# Patient Record
Sex: FEMALE | NOT HISPANIC OR LATINO | URBAN - METROPOLITAN AREA
[De-identification: names, ages, dates, MRNs, and addresses within clinical notes are randomized per-mention and may not be internally consistent; named-entity substitution may affect disease eponyms.]

---

## 2020-07-25 ENCOUNTER — TELEPHONE ENCOUNTER (OUTPATIENT)
Dept: URBAN - METROPOLITAN AREA CLINIC 13 | Facility: CLINIC | Age: 75
End: 2020-07-25

## 2020-07-25 RX ORDER — LIRAGLUTIDE 6 MG/ML
INJECT 1.2 MG SUBCUTANEOUSLY EVERY DAY INJECTION SUBCUTANEOUS
Refills: 0 | OUTPATIENT
End: 2017-07-12

## 2020-07-25 RX ORDER — NEBIVOLOL HYDROCHLORIDE 2.5 MG/1
TAKE 1 TABLET DAILY TABLET ORAL
Refills: 0 | OUTPATIENT
End: 2019-12-13

## 2020-07-25 RX ORDER — ESCITALOPRAM 10 MG/1
TABLET, FILM COATED ORAL
Qty: 255 | Refills: 0 | OUTPATIENT
Start: 2012-05-21 | End: 2017-04-12

## 2020-07-25 RX ORDER — DOCUSATE SODIUM 100 MG/1
TAKE 1 TABLET DAILY AS DIRECTED TABLET ORAL
Refills: 0 | OUTPATIENT
End: 2017-07-12

## 2020-07-25 RX ORDER — CELECOXIB 200 MG/1
TAKE 1 CAPSULE DAILY CAPSULE ORAL
Qty: 90 | Refills: 0 | OUTPATIENT
End: 2019-12-13

## 2020-07-25 RX ORDER — EZETIMIBE 10 MG/1
TAKE 1 TABLET DAILY TABLET ORAL
Refills: 0 | OUTPATIENT
End: 2019-12-13

## 2020-07-25 RX ORDER — IPRATROPIUM BROMIDE 42 UG/1
USE 2 SPRAYS IN EACH NOSTRIL 2 TO 3 TIMES DAILY SPRAY, METERED NASAL
Refills: 0 | OUTPATIENT
End: 2019-12-13

## 2020-07-25 RX ORDER — PHENAZOPYRIDINE HYDROCHLORIDE 100 MG/1
TAKE 1 CAPSULE TWICE DAILY TABLET, COATED ORAL
Qty: 60 | Refills: 0 | OUTPATIENT
End: 2017-07-12

## 2020-07-25 RX ORDER — IPRATROPIUM BROMIDE 42 UG/1
SPRAY, METERED NASAL
Qty: 105 | Refills: 0 | OUTPATIENT
Start: 2011-11-30 | End: 2017-07-12

## 2020-07-25 RX ORDER — VORTIOXETINE 20 MG/1
TAKE 1 TABLET DAILY TABLET, FILM COATED ORAL
Refills: 0 | OUTPATIENT
End: 2019-06-10

## 2020-07-25 RX ORDER — ESCITALOPRAM 20 MG/1
TAKE 1 TABLET DAILY TABLET, FILM COATED ORAL
Qty: 90 | Refills: 0 | OUTPATIENT
Start: 2012-07-31 | End: 2017-04-12

## 2020-07-25 RX ORDER — LUBIPROSTONE 24 UG/1
TAKE 1 CAPSULE 2 TIMES     DAILY WITH FOOD CAPSULE, GELATIN COATED ORAL
Qty: 180 | Refills: 3 | OUTPATIENT
Start: 2017-07-15 | End: 2019-12-13

## 2020-07-25 RX ORDER — UBIDECARENONE 60 MG
TAKE 1 CAPSULE TWICE DAILY CAPSULE ORAL
Refills: 0 | OUTPATIENT
End: 2017-07-12

## 2020-07-25 RX ORDER — LEVOCETIRIZINE DIHYDROCHLORIDE 5 MG/1
TAKE 1 TABLET DAILY TABLET ORAL
Refills: 0 | OUTPATIENT
Start: 2011-11-12 | End: 2017-04-12

## 2020-07-25 RX ORDER — OXCARBAZEPINE 600 MG/1
TAKE 1 TABLET TWICE DAILY TABLET, FILM COATED ORAL
Refills: 0 | OUTPATIENT
End: 2017-04-12

## 2020-07-25 RX ORDER — LINACLOTIDE 290 UG/1
TAKE 1 CAPSULE DAILY ON EMPTY STOMACH CAPSULE, GELATIN COATED ORAL
Qty: 30 | Refills: 1 | OUTPATIENT
End: 2017-07-15

## 2020-07-25 RX ORDER — CYCLOSPORINE 0.5 MG/ML
USE AS DIRECTED EMULSION OPHTHALMIC
Refills: 0 | OUTPATIENT
End: 2018-02-19

## 2020-07-25 RX ORDER — ROSUVASTATIN CALCIUM 40 MG/1
TAKE 1 TABLET DAILY TABLET, FILM COATED ORAL
Refills: 0 | OUTPATIENT
Start: 2011-08-19 | End: 2017-07-12

## 2020-07-25 RX ORDER — METHYLCELLULOSE 2 G/10.2G
USE AS DIRECTED POWDER, FOR SOLUTION ORAL
Refills: 0 | OUTPATIENT
End: 2017-07-12

## 2020-07-25 RX ORDER — ARIPIPRAZOLE 5 MG/1
TAKE 1 TABLET TWICE DAILY TABLET ORAL
Refills: 0 | OUTPATIENT
End: 2018-10-11

## 2020-07-25 RX ORDER — LEVOTHYROXINE SODIUM 75 UG/1
TAKE 1 TABLET DAILY TABLET ORAL
Refills: 0 | OUTPATIENT
End: 2020-04-23

## 2020-07-25 RX ORDER — LINACLOTIDE 290 UG/1
TAKE 1 CAPSULE DAILY ON EMPTY STOMACH CAPSULE, GELATIN COATED ORAL
Qty: 90 | Refills: 1 | OUTPATIENT
Start: 2017-07-17 | End: 2017-07-20

## 2020-07-25 RX ORDER — POLYETHYLENE GLYCOL 3350, SODIUM CHLORIDE, SODIUM BICARBONATE AND POTASSIUM CHLORIDE WITH LEMON FLAVOR 420; 11.2; 5.72; 1.48 G/4L; G/4L; G/4L; G/4L
DRINK 8OUNCES EVERY 20 MINUTES UNTIL GONE STARTING AT 5PM DAY BEFORE PROCEDURE POWDER, FOR SOLUTION ORAL
Qty: 1 | Refills: 0 | OUTPATIENT
Start: 2012-02-01 | End: 2012-10-12

## 2020-07-25 RX ORDER — PROPRANOLOL HYDROCHLORIDE 10 MG/1
TAKE 1 TABLET 3 TIMES DAILY BEFORE MEALS TABLET ORAL
Refills: 0 | OUTPATIENT
Start: 2019-05-28 | End: 2019-12-13

## 2020-07-25 RX ORDER — LUBIPROSTONE 24 UG/1
TAKE ONE CAPSULE BY MOUTH TWICE A DAY CAPSULE, GELATIN COATED ORAL
Qty: 60 | Refills: 10 | OUTPATIENT
End: 2020-04-23

## 2020-07-25 RX ORDER — LANSOPRAZOLE 30 MG/1
TAKE 1 CAPSULE TWICE DAILY CAPSULE, DELAYED RELEASE ORAL
Refills: 0 | OUTPATIENT
Start: 2011-01-03 | End: 2017-04-12

## 2020-07-25 RX ORDER — GABAPENTIN 800 MG/1
TAKE 1 TABLET 3 TIMES DAILY. TDD:300MG TABLET, FILM COATED ORAL
Refills: 0 | OUTPATIENT
End: 2018-02-19

## 2020-07-25 RX ORDER — DULOXETINE HCL 30 MG
TAKE 2 CAPSULES DAILY CAPSULE,DELAYED RELEASE (ENTERIC COATED) ORAL
Refills: 0 | OUTPATIENT
End: 2018-10-11

## 2020-07-25 RX ORDER — EZETIMIBE 10 MG/1
TAKE 1 TABLET DAILY TABLET ORAL
Refills: 0 | OUTPATIENT
End: 2017-07-12

## 2020-07-25 RX ORDER — MULTIVIT-MIN/FOLIC/VIT K/LYCOP 400-300MCG
TAKE 2 TABLET DAILY TABLET ORAL
Refills: 0 | OUTPATIENT
End: 2017-07-12

## 2020-07-25 RX ORDER — IBUPROFEN 200 MG/1
TAKE 2 CAPSULE 3 TIMES DAILY CAPSULE, LIQUID FILLED ORAL
Refills: 0 | OUTPATIENT
End: 2017-07-12

## 2020-07-25 RX ORDER — HYDROXYZINE PAMOATE 25 MG/1
TAKE 1 CAPSULE EVERY 8 HOURS PRN CAPSULE ORAL
Refills: 0 | OUTPATIENT
Start: 2019-05-22 | End: 2019-12-13

## 2020-07-25 RX ORDER — LEVOTHYROXINE SODIUM 88 UG/1
TAKE 1 TABLET DAILY AS DIRECTED TABLET ORAL
Refills: 0 | OUTPATIENT
End: 2017-07-12

## 2020-07-25 RX ORDER — TEMAZEPAM 15 MG/1
TAKE 1 CAPSULE AT BEDTIME CAPSULE ORAL
Refills: 0 | OUTPATIENT
Start: 2011-01-03 | End: 2017-07-12

## 2020-07-25 RX ORDER — FAMOTIDINE 40 MG/1
TAKE 1 TABLET BEDTIME TABLET ORAL
Qty: 90 | Refills: 1 | OUTPATIENT
Start: 2020-03-04 | End: 2020-04-23

## 2020-07-25 RX ORDER — NYSTATIN 100000 [USP'U]/G
APPLY SPARINGLY TO AFFECTED AREA(S) TWICE DAILY POWDER TOPICAL
Qty: 1 | Refills: 1 | OUTPATIENT
Start: 2020-03-04 | End: 2020-04-23

## 2020-07-25 RX ORDER — TEMAZEPAM 30 MG/1
TAKE 1 CAPSULE AT BEDTIME AS NEEDED CAPSULE ORAL
Refills: 0 | OUTPATIENT
End: 2020-03-04

## 2020-07-25 RX ORDER — LIRAGLUTIDE 6 MG/ML
INJECT 1.8 MG SUBCUTANEOUSLY EVERY DAY INJECTION SUBCUTANEOUS
Refills: 0 | OUTPATIENT
End: 2018-02-19

## 2020-07-25 RX ORDER — MELOXICAM 7.5 MG/1
TAKE 1 TABLET DAILY WITH FOOD TABLET ORAL
Refills: 0 | OUTPATIENT
End: 2020-03-04

## 2020-07-25 RX ORDER — METFORMIN HYDROCHLORIDE 500 MG/1
TAKE 1 TABLET DAILY AS DIRECTED TABLET, EXTENDED RELEASE ORAL
Refills: 0 | OUTPATIENT
Start: 2019-05-02 | End: 2019-12-13

## 2020-07-25 RX ORDER — LUBIPROSTONE 24 UG/1
TAKE 1 CAPSULE TWICE DAILY CAPSULE, GELATIN COATED ORAL
Qty: 28 | Refills: 0 | OUTPATIENT
Start: 2011-12-23 | End: 2017-04-12

## 2020-07-25 RX ORDER — LEVOTHYROXINE SODIUM 112 UG/1
TAKE 1 TABLET DAILY TABLET ORAL
Refills: 0 | OUTPATIENT
Start: 2011-02-21 | End: 2017-04-12

## 2020-07-26 ENCOUNTER — TELEPHONE ENCOUNTER (OUTPATIENT)
Dept: URBAN - METROPOLITAN AREA CLINIC 13 | Facility: CLINIC | Age: 75
End: 2020-07-26

## 2020-07-26 RX ORDER — FLUDROCORTISONE ACETATE 0.1 MG/1
TABLET ORAL
Qty: 30 | Refills: 0 | Status: ACTIVE | COMMUNITY
Start: 2018-11-19

## 2020-07-26 RX ORDER — TOPIRAMATE 25 MG/1
TABLET, FILM COATED ORAL
Qty: 360 | Refills: 0 | Status: ACTIVE | COMMUNITY
Start: 2018-06-25

## 2020-07-26 RX ORDER — QUETIAPINE FUMARATE 100 MG/1
TABLET, FILM COATED ORAL
Qty: 180 | Refills: 0 | Status: ACTIVE | COMMUNITY
Start: 2018-06-19

## 2020-07-26 RX ORDER — HYDROCODONE BITARTRATE AND ACETAMINOPHEN 5; 325 MG/1; MG/1
TK 1 TO 2 TS PO Q 6 H PRN P. TK WF TABLET ORAL
Qty: 20 | Refills: 0 | Status: ACTIVE | COMMUNITY
Start: 2019-05-20

## 2020-07-26 RX ORDER — HYDROCODONE BITARTRATE AND ACETAMINOPHEN 5; 325 MG/1; MG/1
TABLET ORAL
Qty: 90 | Refills: 0 | Status: ACTIVE | COMMUNITY
Start: 2020-04-27

## 2020-07-26 RX ORDER — PROPRANOLOL HYDROCHLORIDE 20 MG/1
TABLET ORAL
Qty: 270 | Refills: 0 | Status: ACTIVE | COMMUNITY
Start: 2020-03-05

## 2020-07-26 RX ORDER — BUSPIRONE HYDROCHLORIDE 15 MG/1
TABLET ORAL
Qty: 60 | Refills: 0 | Status: ACTIVE | COMMUNITY
Start: 2019-04-30

## 2020-07-26 RX ORDER — PREDNISONE 10 MG/1
TABLET ORAL
Qty: 36 | Refills: 0 | Status: ACTIVE | COMMUNITY
Start: 2011-10-31

## 2020-07-26 RX ORDER — LIOTHYRONINE SODIUM 5 UG/1
TABLET ORAL
Qty: 180 | Refills: 0 | Status: ACTIVE | COMMUNITY
Start: 2019-10-03

## 2020-07-26 RX ORDER — POLYETHYLENE GLYCOL 3350, SODIUM CHLORIDE, SODIUM BICARBONATE AND POTASSIUM CHLORIDE WITH LEMON FLAVOR 420; 11.2; 5.72; 1.48 G/4L; G/4L; G/4L; G/4L
TAKE AS DIRECTED POWDER, FOR SOLUTION ORAL
Qty: 1 | Refills: 0 | Status: ACTIVE | COMMUNITY
Start: 2020-05-19

## 2020-07-26 RX ORDER — ROSUVASTATIN CALCIUM 40 MG/1
TAKE 1 TABLET DAILY TABLET, FILM COATED ORAL
Refills: 0 | Status: ACTIVE | COMMUNITY

## 2020-07-26 RX ORDER — LEVOTHYROXINE SODIUM 100 UG/1
TABLET ORAL
Qty: 90 | Refills: 0 | Status: ACTIVE | COMMUNITY
Start: 2012-04-11

## 2020-07-26 RX ORDER — ASCORBIC ACID 500 MG
TAKE 2 TABLET DAILY TABLET ORAL
Refills: 0 | Status: ACTIVE | COMMUNITY

## 2020-07-26 RX ORDER — SERTRALINE HYDROCHLORIDE 112 UG/1
TABLET ORAL
Qty: 30 | Refills: 0 | Status: ACTIVE | COMMUNITY
Start: 2012-09-27

## 2020-07-26 RX ORDER — MIRTAZAPINE 15 MG/1
TABLET, FILM COATED ORAL
Qty: 90 | Refills: 0 | Status: ACTIVE | COMMUNITY
Start: 2019-04-04

## 2020-07-26 RX ORDER — MAGNESIUM OXIDE 200 MG
TAKE 1 CAPSULE BEDTIME TABLET,CHEWABLE ORAL
Refills: 0 | Status: ACTIVE | COMMUNITY

## 2020-07-26 RX ORDER — CEFUROXIME AXETIL 500 MG/1
TABLET, FILM COATED ORAL
Qty: 20 | Refills: 0 | Status: ACTIVE | COMMUNITY
Start: 2019-10-08

## 2020-07-26 RX ORDER — LEVOTHYROXINE SODIUM 50 UG/1
TAKE 1 TABLET DAILY TABLET ORAL
Refills: 0 | Status: ACTIVE | COMMUNITY
Start: 2019-08-20

## 2020-07-26 RX ORDER — IPRATROPIUM BROMIDE 21 UG/1
SPRAY, METERED NASAL
Qty: 30 | Refills: 0 | Status: ACTIVE | COMMUNITY
Start: 2018-11-19

## 2020-07-26 RX ORDER — CEFDINIR 300 MG/1
CAPSULE ORAL
Qty: 28 | Refills: 0 | Status: ACTIVE | COMMUNITY
Start: 2011-10-19

## 2020-07-26 RX ORDER — CEFDINIR 300 MG/1
CAPSULE ORAL
Qty: 20 | Refills: 0 | Status: ACTIVE | COMMUNITY
Start: 2011-01-31

## 2020-07-26 RX ORDER — AMOXICILLIN AND CLAVULANATE POTASSIUM 875; 125 MG/1; MG/1
TABLET, FILM COATED ORAL
Qty: 28 | Refills: 0 | Status: ACTIVE | COMMUNITY
Start: 2019-05-31

## 2020-07-26 RX ORDER — LUBIPROSTONE 24 UG/1
TAKE 1 CAPSULE TWICE DAILY CAPSULE, GELATIN COATED ORAL
Qty: 180 | Refills: 3 | Status: ACTIVE | COMMUNITY
Start: 2020-04-23

## 2020-07-26 RX ORDER — OMEPRAZOLE 40 MG/1
TAKE 1 CAPSULE TWICE     DAILY CAPSULE, DELAYED RELEASE ORAL
Refills: 5 | Status: ACTIVE | COMMUNITY

## 2020-07-26 RX ORDER — ALPRAZOLAM 0.5 MG/1
TABLET ORAL
Qty: 10 | Refills: 0 | Status: ACTIVE | COMMUNITY
Start: 2018-07-17

## 2020-07-26 RX ORDER — ROSUVASTATIN CALCIUM 20 MG
TABLET ORAL
Qty: 90 | Refills: 0 | Status: ACTIVE | COMMUNITY
Start: 2011-01-31

## 2020-07-26 RX ORDER — TEMAZEPAM 22.5 MG/1
CAPSULE ORAL
Qty: 90 | Refills: 0 | Status: ACTIVE | COMMUNITY
Start: 2018-06-25

## 2020-07-26 RX ORDER — QUETIAPINE FUMARATE 400 MG/1
TABLET, FILM COATED ORAL
Qty: 90 | Refills: 0 | Status: ACTIVE | COMMUNITY
Start: 2020-07-16

## 2020-07-26 RX ORDER — LEVOTHYROXINE SODIUM 0.07 MG/1
TABLET ORAL
Qty: 90 | Refills: 0 | Status: ACTIVE | COMMUNITY
Start: 2018-07-22

## 2020-07-26 RX ORDER — PROPRANOLOL HYDROCHLORIDE 20 MG/1
TABLET ORAL
Qty: 270 | Refills: 0 | Status: ACTIVE | COMMUNITY
Start: 2019-07-12

## 2020-07-26 RX ORDER — PREGABALIN 50 MG/1
CAPSULE ORAL
Qty: 60 | Refills: 0 | Status: ACTIVE | COMMUNITY
Start: 2020-05-01

## 2020-07-26 RX ORDER — LIRAGLUTIDE 6 MG/ML
INJECT 0.6MG-1.8MG SUBCUTANEOUSLY DAILY INJECTION SUBCUTANEOUS
Refills: 0 | Status: ACTIVE | COMMUNITY

## 2020-07-26 RX ORDER — IPRATROPIUM BROMIDE 21 UG/1
SPRAY, METERED NASAL
Qty: 30 | Refills: 0 | Status: ACTIVE | COMMUNITY
Start: 2011-10-19

## 2020-07-26 RX ORDER — MELOXICAM 7.5 MG/1
TABLET ORAL
Qty: 30 | Refills: 0 | Status: ACTIVE | COMMUNITY
Start: 2019-12-03

## 2020-07-26 RX ORDER — PANTOPRAZOLE SODIUM 40 MG/1
TABLET, DELAYED RELEASE ORAL
Qty: 60 | Refills: 0 | Status: ACTIVE | COMMUNITY
Start: 2018-11-19

## 2020-07-26 RX ORDER — LEVOFLOXACIN 500 MG/1
TABLET, FILM COATED ORAL
Qty: 10 | Refills: 0 | Status: ACTIVE | COMMUNITY
Start: 2011-10-31

## 2020-07-26 RX ORDER — NEBIVOLOL HYDROCHLORIDE 2.5 MG/1
TABLET ORAL
Qty: 90 | Refills: 0 | Status: ACTIVE | COMMUNITY
Start: 2019-02-26

## 2020-07-26 RX ORDER — LEVOFLOXACIN 500 MG/1
TABLET, FILM COATED ORAL
Qty: 10 | Refills: 0 | Status: ACTIVE | COMMUNITY
Start: 2012-10-26

## 2020-07-26 RX ORDER — TEMAZEPAM 15 MG/1
TAKE 15MG-22.5MG CAPSULES  AT BEDTIME AS NEEDED CAPSULE ORAL
Refills: 0 | Status: ACTIVE | COMMUNITY
Start: 2020-01-03

## 2020-07-26 RX ORDER — OXCARBAZEPINE 300 MG/1
TABLET, FILM COATED ORAL
Qty: 60 | Refills: 0 | Status: ACTIVE | COMMUNITY
Start: 2011-02-08

## 2020-07-26 RX ORDER — TRAZODONE HYDROCHLORIDE 100 MG/1
TAKE 1/2 TO 1 TABLET AT BEDTIME TABLET, FILM COATED ORAL
Refills: 0 | Status: ACTIVE | COMMUNITY

## 2020-07-26 RX ORDER — VENLAFAXINE HYDROCHLORIDE 150 MG/1
TAKE 2 CAPSULES DAILY CAPSULE, EXTENDED RELEASE ORAL
Refills: 0 | Status: ACTIVE | COMMUNITY
Start: 2019-02-26

## 2020-07-26 RX ORDER — HYDROCORTISONE 25 MG/G
CREAM TOPICAL
Qty: 30 | Refills: 0 | Status: ACTIVE | COMMUNITY
Start: 2019-12-03

## 2020-07-26 RX ORDER — METHYLPREDNISOLONE 4 MG/1
TABLET ORAL
Qty: 21 | Refills: 0 | Status: ACTIVE | COMMUNITY
Start: 2020-04-10

## 2020-07-26 RX ORDER — HYDROCORTISONE ACETATE 25 MG
SUPPOSITORY, RECTAL RECTAL
Qty: 14 | Refills: 0 | Status: ACTIVE | COMMUNITY
Start: 2019-06-28

## 2020-07-26 RX ORDER — FLUTICASONE FUROATE AND VILANTEROL TRIFENATATE 100; 25 UG/1; UG/1
POWDER RESPIRATORY (INHALATION)
Qty: 60 | Refills: 0 | Status: ACTIVE | COMMUNITY
Start: 2019-10-08

## 2020-07-26 RX ORDER — QUETIAPINE FUMARATE 25 MG/1
TABLET, FILM COATED ORAL
Qty: 90 | Refills: 0 | Status: ACTIVE | COMMUNITY
Start: 2019-02-05

## 2020-07-26 RX ORDER — MIRTAZAPINE 15 MG/1
TABLET, FILM COATED ORAL
Qty: 30 | Refills: 0 | Status: ACTIVE | COMMUNITY
Start: 2018-11-19

## 2020-07-26 RX ORDER — TEMAZEPAM 22.5 MG/1
CAPSULE ORAL
Qty: 90 | Refills: 0 | Status: ACTIVE | COMMUNITY
Start: 2020-04-16

## 2020-07-26 RX ORDER — AZITHROMYCIN DIHYDRATE 250 MG/1
TABLET, FILM COATED ORAL
Qty: 6 | Refills: 0 | Status: ACTIVE | COMMUNITY
Start: 2011-10-14

## 2020-07-26 RX ORDER — CELECOXIB 200 MG/1
TAKE 1 CAPSULE DAILY AS NEEDED CAPSULE ORAL
Refills: 0 | Status: ACTIVE | COMMUNITY

## 2020-07-26 RX ORDER — AMOXICILLIN 500 MG/1
CAPSULE ORAL
Qty: 12 | Refills: 0 | Status: ACTIVE | COMMUNITY
Start: 2019-04-23

## 2020-07-26 RX ORDER — BUSPIRONE HYDROCHLORIDE 10 MG/1
TABLET ORAL
Qty: 120 | Refills: 0 | Status: ACTIVE | COMMUNITY
Start: 2019-05-22

## 2020-07-26 RX ORDER — FLUDROCORTISONE ACETATE 0.1 MG/1
TABLET ORAL
Qty: 90 | Refills: 0 | Status: ACTIVE | COMMUNITY
Start: 2018-12-26

## 2020-07-26 RX ORDER — EZETIMIBE 10 MG/1
TAKE 1 TABLET DAILY TABLET ORAL
Refills: 0 | Status: ACTIVE | COMMUNITY
Start: 2020-03-04

## 2020-07-26 RX ORDER — IPRATROPIUM BROMIDE 0.5 MG/2.5ML
USE 1 UNIT DOSE VIA NEBULIZER  4 TIMES A DAY AS NEEDED SOLUTION RESPIRATORY (INHALATION)
Refills: 0 | Status: ACTIVE | COMMUNITY

## 2020-07-26 RX ORDER — FLUTICASONE FUROATE AND VILANTEROL TRIFENATATE 100; 25 UG/1; UG/1
USE 1 INHALATION ONCE DAILY POWDER RESPIRATORY (INHALATION)
Refills: 0 | Status: ACTIVE | COMMUNITY
Start: 2020-01-05

## 2020-07-26 RX ORDER — TEMAZEPAM 22.5 MG/1
CAPSULE ORAL
Qty: 30 | Refills: 0 | Status: ACTIVE | COMMUNITY
Start: 2019-12-10

## 2020-07-26 RX ORDER — IBANDRONATE SODIUM 150 MG/1
TABLET, FILM COATED ORAL
Qty: 3 | Refills: 0 | Status: ACTIVE | COMMUNITY
Start: 2019-09-17

## 2020-07-26 RX ORDER — METHYLPREDNISOLONE 4 MG/1
TABLET ORAL
Qty: 21 | Refills: 0 | Status: ACTIVE | COMMUNITY
Start: 2019-11-08

## 2020-07-26 RX ORDER — LEVOTHYROXINE SODIUM 50 UG/1
TABLET ORAL
Qty: 45 | Refills: 0 | Status: ACTIVE | COMMUNITY
Start: 2019-10-27

## 2020-07-26 RX ORDER — PROPRANOLOL HYDROCHLORIDE 20 MG/1
TABLET ORAL
Qty: 30 | Refills: 0 | Status: ACTIVE | COMMUNITY
Start: 2019-05-02

## 2020-07-26 RX ORDER — AMOXICILLIN 500 MG/1
CAPSULE ORAL
Qty: 21 | Refills: 0 | Status: ACTIVE | COMMUNITY
Start: 2011-05-20

## 2020-07-26 RX ORDER — AMOXICILLIN 500 MG/1
DNC CAPSULE ORAL
Qty: 12 | Refills: 0 | Status: ACTIVE | COMMUNITY
Start: 2019-05-10

## 2020-07-26 RX ORDER — QUETIAPINE FUMARATE 100 MG/1
TABLET, FILM COATED ORAL
Qty: 30 | Refills: 0 | Status: ACTIVE | COMMUNITY
Start: 2020-07-09

## 2020-07-26 RX ORDER — LEVOTHYROXINE SODIUM 50 UG/1
TABLET ORAL
Qty: 90 | Refills: 0 | Status: ACTIVE | COMMUNITY
Start: 2020-01-29

## 2020-07-26 RX ORDER — QUETIAPINE FUMARATE 300 MG/1
TAKE 1 TABLET DAILY TABLET, FILM COATED ORAL
Refills: 0 | Status: ACTIVE | COMMUNITY
Start: 2018-08-15

## 2020-07-26 RX ORDER — OXCARBAZEPINE 600 MG/1
TABLET, FILM COATED ORAL
Qty: 180 | Refills: 0 | Status: ACTIVE | COMMUNITY
Start: 2011-12-02

## 2020-07-26 RX ORDER — METOPROLOL SUCCINATE 25 MG/1
TABLET, EXTENDED RELEASE ORAL
Qty: 30 | Refills: 0 | Status: ACTIVE | COMMUNITY
Start: 2018-11-19

## 2020-07-26 RX ORDER — VENLAFAXINE HYDROCHLORIDE 75 MG/1
CAPSULE, EXTENDED RELEASE ORAL
Qty: 90 | Refills: 0 | Status: ACTIVE | COMMUNITY
Start: 2018-11-19

## 2020-07-26 RX ORDER — LIOTHYRONINE SODIUM 5 UG/1
TAKE 1 TABLET DAILY TABLET ORAL
Refills: 0 | Status: ACTIVE | COMMUNITY

## 2020-07-26 RX ORDER — B-COMPLEX WITH VITAMIN C
TAKE 1 TABLET DAILY TABLET ORAL
Refills: 0 | Status: ACTIVE | COMMUNITY

## 2020-07-26 RX ORDER — LITHIUM CARBONATE 300 MG/1
TABLET ORAL
Qty: 180 | Refills: 0 | Status: ACTIVE | COMMUNITY
Start: 2011-04-15

## 2020-07-31 ENCOUNTER — OFFICE VISIT (OUTPATIENT)
Dept: URBAN - METROPOLITAN AREA CLINIC 113 | Facility: CLINIC | Age: 75
End: 2020-07-31

## 2020-08-21 ENCOUNTER — OFFICE VISIT (OUTPATIENT)
Dept: URBAN - METROPOLITAN AREA CLINIC 113 | Facility: CLINIC | Age: 75
End: 2020-08-21
Payer: MEDICARE

## 2020-08-21 VITALS
TEMPERATURE: 97.7 F | SYSTOLIC BLOOD PRESSURE: 150 MMHG | DIASTOLIC BLOOD PRESSURE: 80 MMHG | BODY MASS INDEX: 30.33 KG/M2 | WEIGHT: 164.8 LBS | HEIGHT: 62 IN | HEART RATE: 98 BPM

## 2020-08-21 DIAGNOSIS — K59.01 SLOW TRANSIT CONSTIPATION: ICD-10-CM

## 2020-08-21 DIAGNOSIS — R14.3 EXCESSIVE GAS: ICD-10-CM

## 2020-08-21 DIAGNOSIS — K21.9 GASTROESOPHAGEAL REFLUX DISEASE WITHOUT ESOPHAGITIS: ICD-10-CM

## 2020-08-21 PROCEDURE — G9903 PT SCRN TBCO ID AS NON USER: HCPCS | Performed by: INTERNAL MEDICINE

## 2020-08-21 PROCEDURE — 1036F TOBACCO NON-USER: CPT | Performed by: INTERNAL MEDICINE

## 2020-08-21 PROCEDURE — G8420 CALC BMI NORM PARAMETERS: HCPCS | Performed by: INTERNAL MEDICINE

## 2020-08-21 PROCEDURE — 99213 OFFICE O/P EST LOW 20 MIN: CPT | Performed by: INTERNAL MEDICINE

## 2020-08-21 PROCEDURE — G8427 DOCREV CUR MEDS BY ELIG CLIN: HCPCS | Performed by: INTERNAL MEDICINE

## 2020-08-21 RX ORDER — LIOTHYRONINE SODIUM 5 UG/1
TAKE 1 TABLET DAILY TABLET ORAL
Refills: 0 | Status: ACTIVE | COMMUNITY

## 2020-08-21 RX ORDER — ASCORBIC ACID 500 MG
TAKE 2 TABLET DAILY TABLET ORAL
Refills: 0 | Status: ACTIVE | COMMUNITY

## 2020-08-21 RX ORDER — LEVOTHYROXINE SODIUM 100 UG/1
TABLET ORAL
Qty: 90 | Refills: 0 | Status: ACTIVE | COMMUNITY
Start: 2012-04-11

## 2020-08-21 RX ORDER — LIOTHYRONINE SODIUM 5 MCG
1 TABLET ON AN EMPTY STOMACH TABLET ORAL ONCE A DAY
Status: ACTIVE | COMMUNITY

## 2020-08-21 RX ORDER — OMEPRAZOLE 40 MG/1
1 CAPSULE 30 MINUTES BEFORE MORNING MEAL CAPSULE, DELAYED RELEASE ORAL ONCE A DAY
Status: ACTIVE | COMMUNITY

## 2020-08-21 RX ORDER — PROPRANOLOL HYDROCHLORIDE 20 MG/1
TABLET ORAL
Qty: 30 | Refills: 0 | Status: ACTIVE | COMMUNITY
Start: 2019-05-02

## 2020-08-21 RX ORDER — IBANDRONATE SODIUM 150 MG/1
1 TABLET TABLET, FILM COATED ORAL
Status: ACTIVE | COMMUNITY

## 2020-08-21 RX ORDER — POLYETHYLENE GLYCOL 3350 17 G/17G
AS DIRECTED POWDER, FOR SOLUTION ORAL
Status: ACTIVE | COMMUNITY

## 2020-08-21 RX ORDER — QUETIAPINE FUMARATE 400 MG/1
1 TABLET AT BEDTIME TABLET ORAL ONCE A DAY
Status: ACTIVE | COMMUNITY

## 2020-08-21 RX ORDER — FLUTICASONE FUROATE AND VILANTEROL TRIFENATATE 100; 25 UG/1; UG/1
1 PUFF POWDER RESPIRATORY (INHALATION) ONCE A DAY
Status: ACTIVE | COMMUNITY

## 2020-08-21 RX ORDER — OMEPRAZOLE 40 MG/1
TAKE 1 CAPSULE TWICE     DAILY CAPSULE, DELAYED RELEASE ORAL
Refills: 5 | Status: ACTIVE | COMMUNITY

## 2020-08-21 RX ORDER — LUBIPROSTONE 24 UG/1
1 CAPSULE WITH FOOD AND WATER CAPSULE, GELATIN COATED ORAL TWICE A DAY
Status: ACTIVE | COMMUNITY

## 2020-08-21 RX ORDER — LUBIPROSTONE 24 UG/1
TAKE 1 CAPSULE TWICE DAILY CAPSULE, GELATIN COATED ORAL
OUTPATIENT
Start: 2020-04-23

## 2020-08-21 RX ORDER — WHEAT DEXTRIN 3 G/3.5 G
AS DIRECTED POWDER (GRAM) ORAL
Status: ACTIVE | COMMUNITY

## 2020-08-21 RX ORDER — VENLAFAXINE HYDROCHLORIDE 150 MG/1
1 CAPSULE WITH FOOD CAPSULE, EXTENDED RELEASE ORAL ONCE A DAY
Status: ACTIVE | COMMUNITY

## 2020-08-21 RX ORDER — OMEPRAZOLE 40 MG/1
TAKE 1 CAPSULE TWICE     DAILY CAPSULE, DELAYED RELEASE ORAL
OUTPATIENT

## 2020-08-21 RX ORDER — TRAZODONE HYDROCHLORIDE 100 MG/1
TAKE 1/2 TO 1 TABLET AT BEDTIME TABLET, FILM COATED ORAL
Refills: 0 | Status: ACTIVE | COMMUNITY

## 2020-08-21 RX ORDER — FAMOTIDINE 40 MG/1
1 TABLET AT BEDTIME TABLET, FILM COATED ORAL ONCE A DAY
Status: ACTIVE | COMMUNITY

## 2020-08-21 RX ORDER — EZETIMIBE 10 MG/1
TAKE 1 TABLET DAILY TABLET ORAL
Refills: 0 | Status: ACTIVE | COMMUNITY
Start: 2020-03-04

## 2020-08-21 RX ORDER — GABAPENTIN 400 MG/1
TAKE 1 TABLET 3 TIMES DAILY CAPSULE ORAL ONCE A DAY
Refills: 0 | Status: ACTIVE | COMMUNITY

## 2020-08-21 RX ORDER — LUBIPROSTONE 24 UG/1
TAKE 1 CAPSULE TWICE DAILY CAPSULE, GELATIN COATED ORAL
Qty: 180 | Refills: 3 | Status: ACTIVE | COMMUNITY
Start: 2020-04-23

## 2020-08-21 RX ORDER — LIRAGLUTIDE 6 MG/ML
INJECT 0.6MG-1.8MG SUBCUTANEOUSLY DAILY INJECTION SUBCUTANEOUS
Refills: 0 | Status: ACTIVE | COMMUNITY

## 2020-08-21 RX ORDER — ALUMINUM ZIRCONIUM OCTACHLOROHYDREX GLY 16 G/100G
AS DIRECTED GEL TOPICAL
Status: ACTIVE | COMMUNITY

## 2020-08-21 RX ORDER — ROSUVASTATIN CALCIUM 40 MG/1
TAKE 1 TABLET DAILY TABLET, FILM COATED ORAL
Refills: 0 | Status: ACTIVE | COMMUNITY

## 2020-08-21 NOTE — HPI-OTHER HISTORIES
CT abdomen and pelvis with contrast (5/8/17): Constipation, atherosclerotic vascular disease Colonoscopy (12/18/12): difficult procedure secondary to looping, removal of a 4 mm cecal hyperplastic polyp, there was a descending colon lipoma, sigmoid colon diverticulosis.

## 2020-08-21 NOTE — HPI-TODAY'S VISIT:
Ms Reynolds is a 75-year-old woman with a history of type 2 diabetes, coronary disease and peripheral vascular disease presenting for follow-up regarding GERD and constipation predominant irritable bowel syndrome. She was last seen on 4/23/2020 for follow-up regarding irritable bowel syndrome with constipation, excessive gas and GERD.  For her GERD she was recommended to use omeprazole 40 mg twice daily before meals and famotidine 40 mg before bed.  Her constipation was treated with Amitiza 24 mcg twice daily and Benefiber twice daily.  She also had had symptomatic internal hemorrhoids that improved after band ligation of the right anterior internal hemorrhoid, and left lateral internal hemorrhoid. She reports ongoing issues with constipation.  She may go 4 to 7 days without a bowel movement with straining to pass stool.  She denies any red blood per rectum.  She is bothered by the excessive gas and bloating.  She has been taking Amitiza 24 mcg twice daily and Benefiber twice daily.  She occasionally uses MiraLAX daily.  She finds it taking Gas-X improves the excessive gas.  She is also taking Align daily.  She has been gaining weight.  She denies any heartburn, regurgitation or difficulty swallowing taking omeprazole 40 mg twice daily and taking famotidine before bed.

## 2020-12-01 ENCOUNTER — OFFICE VISIT (OUTPATIENT)
Dept: URBAN - METROPOLITAN AREA CLINIC 113 | Facility: CLINIC | Age: 75
End: 2020-12-01

## 2020-12-03 ENCOUNTER — OFFICE VISIT (OUTPATIENT)
Dept: URBAN - METROPOLITAN AREA CLINIC 113 | Facility: CLINIC | Age: 75
End: 2020-12-03
Payer: MEDICARE

## 2020-12-03 VITALS
BODY MASS INDEX: 31.28 KG/M2 | SYSTOLIC BLOOD PRESSURE: 124 MMHG | TEMPERATURE: 97.1 F | DIASTOLIC BLOOD PRESSURE: 87 MMHG | HEIGHT: 62 IN | WEIGHT: 170 LBS | HEART RATE: 98 BPM

## 2020-12-03 DIAGNOSIS — R14.3 EXCESSIVE GAS: ICD-10-CM

## 2020-12-03 DIAGNOSIS — K21.9 GASTROESOPHAGEAL REFLUX DISEASE WITHOUT ESOPHAGITIS: ICD-10-CM

## 2020-12-03 DIAGNOSIS — K59.01 CONSTIPATION BY DELAYED COLONIC TRANSIT: ICD-10-CM

## 2020-12-03 PROCEDURE — G8482 FLU IMMUNIZE ORDER/ADMIN: HCPCS | Performed by: INTERNAL MEDICINE

## 2020-12-03 PROCEDURE — 99213 OFFICE O/P EST LOW 20 MIN: CPT | Performed by: INTERNAL MEDICINE

## 2020-12-03 PROCEDURE — G8427 DOCREV CUR MEDS BY ELIG CLIN: HCPCS | Performed by: INTERNAL MEDICINE

## 2020-12-03 PROCEDURE — G9903 PT SCRN TBCO ID AS NON USER: HCPCS | Performed by: INTERNAL MEDICINE

## 2020-12-03 RX ORDER — POLYETHYLENE GLYCOL 3350 17 G/17G
AS DIRECTED POWDER, FOR SOLUTION ORAL ONCE A DAY
OUTPATIENT
Start: 2020-12-03 | End: 2021-01-02

## 2020-12-03 RX ORDER — PROPRANOLOL HYDROCHLORIDE 10 MG/1
1 TABLET TABLET ORAL ONCE A DAY
Status: ACTIVE | COMMUNITY

## 2020-12-03 RX ORDER — TEMAZEPAM 15 MG/1
1 CAPSULE AT BEDTIME AS NEEDED CAPSULE ORAL
Status: ACTIVE | COMMUNITY

## 2020-12-03 RX ORDER — FLUTICASONE FUROATE AND VILANTEROL TRIFENATATE 100; 25 UG/1; UG/1
1 PUFF POWDER RESPIRATORY (INHALATION) ONCE A DAY
Status: ACTIVE | COMMUNITY

## 2020-12-03 RX ORDER — ALUMINUM ZIRCONIUM OCTACHLOROHYDREX GLY 16 G/100G
AS DIRECTED GEL TOPICAL DAILY
OUTPATIENT
Start: 2020-12-03

## 2020-12-03 RX ORDER — EZETIMIBE 10 MG/1
TAKE 1 TABLET DAILY TABLET ORAL
Refills: 0 | Status: ACTIVE | COMMUNITY
Start: 2020-03-04

## 2020-12-03 RX ORDER — LEVOTHYROXINE SODIUM 75 UG/1
1 TABLET IN THE MORNING ON AN EMPTY STOMACH TABLET ORAL ONCE A DAY
Refills: 0 | Status: ACTIVE | COMMUNITY
Start: 2012-04-11

## 2020-12-03 RX ORDER — QUETIAPINE FUMARATE 400 MG/1
1 TABLET AT BEDTIME TABLET ORAL ONCE A DAY
Status: ACTIVE | COMMUNITY

## 2020-12-03 RX ORDER — GUAIFENESIN 600 MG/1
1 TABLET AS NEEDED TABLET, EXTENDED RELEASE ORAL
Status: ACTIVE | COMMUNITY

## 2020-12-03 RX ORDER — TRAZODONE HYDROCHLORIDE 100 MG/1
TAKE 1/2 TO 1 TABLET AT BEDTIME TABLET, FILM COATED ORAL
Refills: 0 | Status: ACTIVE | COMMUNITY

## 2020-12-03 RX ORDER — IPRATROPIUM BROMIDE 0.5 MG/2.5ML
AS DIRECTED SOLUTION RESPIRATORY (INHALATION)
Status: ACTIVE | COMMUNITY

## 2020-12-03 RX ORDER — OMEPRAZOLE 40 MG/1
TAKE 1 CAPSULE TWICE     DAILY CAPSULE, DELAYED RELEASE ORAL
OUTPATIENT

## 2020-12-03 RX ORDER — IBANDRONATE SODIUM 150 MG/1
1 TABLET TABLET, FILM COATED ORAL
Status: ACTIVE | COMMUNITY

## 2020-12-03 RX ORDER — PROPRANOLOL HYDROCHLORIDE 20 MG/1
TABLET ORAL
Qty: 30 | Refills: 0 | Status: ACTIVE | COMMUNITY
Start: 2019-05-02

## 2020-12-03 RX ORDER — ROSUVASTATIN CALCIUM 40 MG/1
TAKE 1 TABLET DAILY TABLET, FILM COATED ORAL
Refills: 0 | Status: ACTIVE | COMMUNITY

## 2020-12-03 RX ORDER — LUBIPROSTONE 24 UG/1
1 CAPSULE WITH FOOD AND WATER CAPSULE, GELATIN COATED ORAL TWICE A DAY
Qty: 60 CAPSULE | Refills: 5 | OUTPATIENT
Start: 2020-12-03 | End: 2021-06-01

## 2020-12-03 RX ORDER — VENLAFAXINE HYDROCHLORIDE 100 MG/1
3  TABLET WITH FOOD TABLET ORAL ONCE A DAY
Status: ACTIVE | COMMUNITY

## 2020-12-03 RX ORDER — OMEPRAZOLE 40 MG/1
TAKE 1 CAPSULE TWICE     DAILY CAPSULE, DELAYED RELEASE ORAL
Status: ACTIVE | COMMUNITY

## 2020-12-03 RX ORDER — CELECOXIB 200 MG/1
1 CAPSULE WITH FOOD CAPSULE ORAL ONCE A DAY
Status: ACTIVE | COMMUNITY

## 2020-12-03 NOTE — HPI-TODAY'S VISIT:
Ms. Reynolds is a 75-year-old woman with a history of type 2 diabetes, coronary artery disease and peripheral vascular disease presenting for follow-up regarding GERD and constipation predominant irritable bowel syndrome. She was last seen on 8/21/2020 for follow-up regarding GERD, constipation predominant irritable bowel syndrome and excessive gas and bloating.  She was recommended to continue Amitiza 24 mcg twice daily with meals, discontinue Benefiber, and add MiraLAX daily before bed.  Her GERD symptoms were well controlled on omeprazole 40 mg twice daily.  She was recommended to discontinue famotidine before bed. She returns for follow-up reporting that she is having alternating bowel habits with diarrhea for 4 days followed by constipation for up to 6 days.  She describes the diarrhea as loose stools with "mucus" up to 5-6 times per day.  She has discontinued MiraLAX and Amitiza and has been using MiraLAX 1 capful daily for constipation.  She describes her stool as "meatballs" requiring straining to pass.  She has had increased amounts of flatus.  She denies any red blood per rectum or weight loss.  She has some lower abdominal cramping pain that associates with the alternating bowel habits.  No modifying factors except for possibly some improvement with a bowel movement.  She also reports having heartburn that is fairly well controlled taking omeprazole 40 mg twice daily.  She denies any dysphagia, vomiting.

## 2020-12-29 ENCOUNTER — TELEPHONE ENCOUNTER (OUTPATIENT)
Dept: URBAN - METROPOLITAN AREA CLINIC 113 | Facility: CLINIC | Age: 75
End: 2020-12-29

## 2021-03-03 ENCOUNTER — OFFICE VISIT (OUTPATIENT)
Dept: URBAN - METROPOLITAN AREA CLINIC 113 | Facility: CLINIC | Age: 76
End: 2021-03-03
Payer: MEDICARE

## 2021-03-03 VITALS — WEIGHT: 170 LBS | BODY MASS INDEX: 31.28 KG/M2 | HEIGHT: 62 IN

## 2021-03-03 DIAGNOSIS — R14.3 EXCESSIVE GAS: ICD-10-CM

## 2021-03-03 DIAGNOSIS — K21.9 GASTROESOPHAGEAL REFLUX DISEASE WITHOUT ESOPHAGITIS: ICD-10-CM

## 2021-03-03 DIAGNOSIS — K59.01 CONSTIPATION BY DELAYED COLONIC TRANSIT: ICD-10-CM

## 2021-03-03 PROCEDURE — 99213 OFFICE O/P EST LOW 20 MIN: CPT | Performed by: INTERNAL MEDICINE

## 2021-03-03 RX ORDER — LEVOTHYROXINE SODIUM 88 UG/1
1 TABLET IN THE MORNING ON AN EMPTY STOMACH TABLET ORAL ONCE A DAY
Refills: 0 | Status: ACTIVE | COMMUNITY
Start: 2012-04-11

## 2021-03-03 RX ORDER — TEMAZEPAM 15 MG/1
1 CAPSULE AT BEDTIME AS NEEDED CAPSULE ORAL
Status: ACTIVE | COMMUNITY

## 2021-03-03 RX ORDER — OMEPRAZOLE 40 MG/1
TAKE 1 CAPSULE TWICE     DAILY CAPSULE, DELAYED RELEASE ORAL
Status: ACTIVE | COMMUNITY

## 2021-03-03 RX ORDER — CELECOXIB 200 MG/1
1 CAPSULE WITH FOOD CAPSULE ORAL ONCE A DAY
Status: ACTIVE | COMMUNITY

## 2021-03-03 RX ORDER — POLYETHYLENE GLYCOL 3350 17 G/17G
AS DIRECTED POWDER, FOR SOLUTION ORAL
OUTPATIENT

## 2021-03-03 RX ORDER — IBUPROFEN SODIUM 256 MG/1
1 TABLET 220 MG WITH FOOD OR MILK AS NEEDED TABLET, COATED ORAL
Status: ACTIVE | COMMUNITY

## 2021-03-03 RX ORDER — OMEPRAZOLE 40 MG/1
TAKE 1 CAPSULE TWICE     DAILY CAPSULE, DELAYED RELEASE ORAL
OUTPATIENT

## 2021-03-03 RX ORDER — PROPRANOLOL HYDROCHLORIDE 20 MG/1
TABLET ORAL
Qty: 30 | Refills: 0 | Status: ACTIVE | COMMUNITY
Start: 2019-05-02

## 2021-03-03 RX ORDER — POLYETHYLENE GLYCOL 3350 17 G/17G
AS DIRECTED POWDER, FOR SOLUTION ORAL
Status: ACTIVE | COMMUNITY

## 2021-03-03 RX ORDER — IBANDRONATE SODIUM 150 MG/1
1 TABLET TABLET, FILM COATED ORAL
Status: ACTIVE | COMMUNITY

## 2021-03-03 RX ORDER — ROSUVASTATIN CALCIUM 40 MG/1
TAKE 1 TABLET DAILY TABLET, FILM COATED ORAL
Refills: 0 | Status: ACTIVE | COMMUNITY

## 2021-03-03 RX ORDER — EZETIMIBE 10 MG/1
TAKE 1 TABLET DAILY TABLET ORAL
Refills: 0 | Status: ACTIVE | COMMUNITY
Start: 2020-03-04

## 2021-03-03 RX ORDER — FLUTICASONE FUROATE AND VILANTEROL TRIFENATATE 100; 25 UG/1; UG/1
1 PUFF POWDER RESPIRATORY (INHALATION) ONCE A DAY
Status: ACTIVE | COMMUNITY

## 2021-03-03 RX ORDER — LUBIPROSTONE 24 UG/1
1 CAPSULE WITH FOOD AND WATER CAPSULE, GELATIN COATED ORAL TWICE A DAY
Qty: 60 CAPSULE | Refills: 5 | Status: ON HOLD | COMMUNITY
Start: 2020-12-03 | End: 2021-06-01

## 2021-03-03 RX ORDER — GUAIFENESIN 600 MG/1
1 TABLET AS NEEDED TABLET, EXTENDED RELEASE ORAL
Status: ACTIVE | COMMUNITY

## 2021-03-03 RX ORDER — LIOTHYRONINE SODIUM 25 UG/1
1 TABLET ON AN EMPTY STOMACH TABLET ORAL ONCE A DAY
Status: ACTIVE | COMMUNITY

## 2021-03-03 RX ORDER — UBIDECARENONE 30 MG
1 TABLET WITH A MEAL CAPSULE ORAL ONCE A DAY
Status: ACTIVE | COMMUNITY

## 2021-03-03 RX ORDER — ALUMINUM ZIRCONIUM OCTACHLOROHYDREX GLY 16 G/100G
AS DIRECTED GEL TOPICAL DAILY
Status: ON HOLD | COMMUNITY
Start: 2020-12-03

## 2021-03-03 RX ORDER — IPRATROPIUM BROMIDE 0.5 MG/2.5ML
AS DIRECTED SOLUTION RESPIRATORY (INHALATION)
Status: ACTIVE | COMMUNITY

## 2021-03-03 RX ORDER — QUETIAPINE FUMARATE 400 MG/1
1 TABLET AT BEDTIME TABLET ORAL ONCE A DAY
Status: ACTIVE | COMMUNITY

## 2021-03-03 RX ORDER — VENLAFAXINE HYDROCHLORIDE 100 MG/1
3  TABLET WITH FOOD TABLET ORAL ONCE A DAY
Status: ACTIVE | COMMUNITY

## 2021-03-03 RX ORDER — GABAPENTIN 300 MG/1
1 CAPSULE CAPSULE ORAL ONCE A DAY
Status: ACTIVE | COMMUNITY

## 2021-03-03 RX ORDER — TRAZODONE HYDROCHLORIDE 100 MG/1
TAKE 1/2 TO 1 TABLET AT BEDTIME TABLET, FILM COATED ORAL
Refills: 0 | Status: ACTIVE | COMMUNITY

## 2021-03-03 RX ORDER — PROPRANOLOL HYDROCHLORIDE 10 MG/1
1 TABLET TABLET ORAL ONCE A DAY
Status: ACTIVE | COMMUNITY

## 2021-03-03 NOTE — HPI-TODAY'S VISIT:
Ms Reynolds is a 76-year-old woman with a history of type 2 diabetes, coronary artery disease, peripheral vascular disease presenting for follow-up regarding GERD and constipation predominant irritable bowel syndrome. She was last seen on 12/3/2020 for follow-up regarding her chronic issues of GERD without esophagitis, constipation, abdominal pain and excessive gas/bloating.  She was recommended to continue omeprazole 40 mg twice daily for her GERD.  Her constipation was suboptimally controlled at that visit so she was recommended to begin Amitiza 24 mcg twice daily and use MiraLAX daily as needed. She returns reporting that she is not taking the Amitiza, but takes the MiraLAX 1 capful daily.  She is typically having a bowel movement daily, but her stool consistency is variable with some lower abdominal cramping pain.  She takes Gas-X with some improvement in the excessive flatulence.  She does not consume any dairy products.  No red blood per rectum.  She has occasional diet related heartburn, but no significant issues with acid reflux taking omeprazole 40 mg twice daily. Labs on 2/12/2021 show normal basic metabolic panel, normal liver function tests, hemoglobin A1c 5.3, WBC 5.0, hemoglobin 12.5, MCV 98, platelets 222, free T4 3.2, TSH 0.02.

## 2021-06-04 ENCOUNTER — WEB ENCOUNTER (OUTPATIENT)
Dept: URBAN - METROPOLITAN AREA CLINIC 113 | Facility: CLINIC | Age: 76
End: 2021-06-04

## 2021-06-04 ENCOUNTER — OFFICE VISIT (OUTPATIENT)
Dept: URBAN - METROPOLITAN AREA CLINIC 113 | Facility: CLINIC | Age: 76
End: 2021-06-04
Payer: MEDICARE

## 2021-06-04 VITALS
DIASTOLIC BLOOD PRESSURE: 80 MMHG | HEART RATE: 105 BPM | TEMPERATURE: 97.3 F | SYSTOLIC BLOOD PRESSURE: 108 MMHG | RESPIRATION RATE: 20 BRPM | WEIGHT: 164 LBS | HEIGHT: 62 IN | BODY MASS INDEX: 30.18 KG/M2

## 2021-06-04 DIAGNOSIS — K64.1 GRADE II INTERNAL HEMORRHOIDS: ICD-10-CM

## 2021-06-04 DIAGNOSIS — L29.0 ANAL PRURITUS: ICD-10-CM

## 2021-06-04 DIAGNOSIS — K59.01 CONSTIPATION BY DELAYED COLONIC TRANSIT: ICD-10-CM

## 2021-06-04 DIAGNOSIS — K21.9 GASTROESOPHAGEAL REFLUX DISEASE WITHOUT ESOPHAGITIS: ICD-10-CM

## 2021-06-04 PROCEDURE — 99213 OFFICE O/P EST LOW 20 MIN: CPT | Performed by: INTERNAL MEDICINE

## 2021-06-04 PROCEDURE — 46600 DIAGNOSTIC ANOSCOPY SPX: CPT | Performed by: INTERNAL MEDICINE

## 2021-06-04 RX ORDER — OMEPRAZOLE 40 MG/1
TAKE 1 CAPSULE TWICE     DAILY CAPSULE, DELAYED RELEASE ORAL
OUTPATIENT

## 2021-06-04 RX ORDER — EZETIMIBE 10 MG/1
TAKE 1 TABLET DAILY TABLET ORAL
Refills: 0 | Status: ACTIVE | COMMUNITY
Start: 2020-03-04

## 2021-06-04 RX ORDER — PROPRANOLOL HYDROCHLORIDE 10 MG/1
1 TABLET IN AM TABLET ORAL
Refills: 0 | Status: ACTIVE | COMMUNITY
Start: 2019-05-02

## 2021-06-04 RX ORDER — TRAZODONE HYDROCHLORIDE 100 MG/1
TAKE 1/2 TO 1 TABLET AT BEDTIME TABLET, FILM COATED ORAL
Refills: 0 | Status: ACTIVE | COMMUNITY

## 2021-06-04 RX ORDER — GUAIFENESIN 600 MG/1
1 TABLET AS NEEDED TABLET, EXTENDED RELEASE ORAL
COMMUNITY

## 2021-06-04 RX ORDER — IBANDRONATE SODIUM 150 MG/1
1 TABLET TABLET, FILM COATED ORAL
Status: ACTIVE | COMMUNITY

## 2021-06-04 RX ORDER — IBUPROFEN SODIUM 256 MG/1
1 TABLET 220 MG WITH FOOD OR MILK AS NEEDED TABLET, COATED ORAL
COMMUNITY

## 2021-06-04 RX ORDER — LIOTHYRONINE SODIUM 25 UG/1
1 TABLET ON AN EMPTY STOMACH TABLET ORAL ONCE A DAY
Status: ACTIVE | COMMUNITY

## 2021-06-04 RX ORDER — POLYETHYLENE GLYCOL 3350 17 G/17G
AS DIRECTED POWDER, FOR SOLUTION ORAL
Status: ACTIVE | COMMUNITY

## 2021-06-04 RX ORDER — FLUTICASONE FUROATE AND VILANTEROL TRIFENATATE 100; 25 UG/1; UG/1
1 PUFF POWDER RESPIRATORY (INHALATION) ONCE A DAY
Status: ACTIVE | COMMUNITY

## 2021-06-04 RX ORDER — TEMAZEPAM 15 MG/1
1 CAPSULE AT BEDTIME AS NEEDED CAPSULE ORAL
Status: ACTIVE | COMMUNITY

## 2021-06-04 RX ORDER — ROSUVASTATIN CALCIUM 40 MG/1
TAKE 1 TABLET DAILY TABLET, FILM COATED ORAL
Refills: 0 | Status: ACTIVE | COMMUNITY

## 2021-06-04 RX ORDER — LIRAGLUTIDE 6 MG/ML
AS DIRECTED INJECTION SUBCUTANEOUS
Status: ACTIVE | COMMUNITY

## 2021-06-04 RX ORDER — OMEPRAZOLE 40 MG/1
TAKE 1 CAPSULE TWICE     DAILY CAPSULE, DELAYED RELEASE ORAL
Status: ACTIVE | COMMUNITY

## 2021-06-04 RX ORDER — LUBIPROSTONE 24 UG/1
1 CAPSULE WITH FOOD AND WATER CAPSULE, GELATIN COATED ORAL TWICE A DAY
Status: ACTIVE | COMMUNITY

## 2021-06-04 RX ORDER — CELECOXIB 200 MG/1
1 CAPSULE WITH FOOD CAPSULE ORAL ONCE A DAY
Status: ACTIVE | COMMUNITY

## 2021-06-04 RX ORDER — ALUMINUM ZIRCONIUM OCTACHLOROHYDREX GLY 16 G/100G
AS DIRECTED GEL TOPICAL DAILY
Status: ON HOLD | COMMUNITY
Start: 2020-12-03

## 2021-06-04 RX ORDER — UBIDECARENONE 30 MG
1 TABLET WITH A MEAL CAPSULE ORAL ONCE A DAY
COMMUNITY

## 2021-06-04 RX ORDER — IPRATROPIUM BROMIDE 0.5 MG/2.5ML
AS DIRECTED SOLUTION RESPIRATORY (INHALATION)
Status: ACTIVE | COMMUNITY

## 2021-06-04 RX ORDER — LEVOTHYROXINE SODIUM 100 UG/1
1 TABLET IN THE MORNING ON AN EMPTY STOMACH TABLET ORAL ONCE A DAY
Refills: 0 | Status: ACTIVE | COMMUNITY
Start: 2012-04-11

## 2021-06-04 RX ORDER — PROPRANOLOL HYDROCHLORIDE 10 MG/1
1 TABLET TABLET ORAL ONCE A DAY
COMMUNITY

## 2021-06-04 RX ORDER — GABAPENTIN 100 MG/1
1 CAPSULE CAPSULE ORAL ONCE A DAY
COMMUNITY

## 2021-06-04 RX ORDER — VENLAFAXINE HYDROCHLORIDE 100 MG/1
3  TABLET WITH FOOD TABLET ORAL ONCE A DAY
Status: ACTIVE | COMMUNITY

## 2021-06-04 RX ORDER — QUETIAPINE FUMARATE 300 MG/1
1 TABLET AT BEDTIME TABLET ORAL ONCE A DAY
Status: ACTIVE | COMMUNITY

## 2021-06-04 NOTE — HPI-TODAY'S VISIT:
Ms. Reynolds is a 75 yo woman with a history of T2DM, CAD, PVD, presenting for 3 month follow up for GERD, constipation predominant IBS and excess gas.   She was last seen 3/3/21 with complaints of chronic constipation and excess gas secondary to a functional bowel disorder. This was being managed with MiraLAX 17 g daily. She was recommended a trial of daily probiotic (Align 1 capsule daily) for hopeful improvement of gas. GERD symptoms were controlled on BID omeprazole. She was encouraged to attempt weaning to once daily dosing as tolerated.  She reports that she is taking MiraLAX in the morning and Amitiza 24 mcg daily as needed.  She reports having irregular bowel movements going 5 to 6 days without a bowel movement alternating with nonbloody diarrhea if she takes Amitiza on a regular basis.  She denies any abdominal pain.  No weight loss or red blood per rectum.  She has had some perianal itching and burning type pain.  She denies any nausea, vomiting or heartburn.

## 2021-06-04 NOTE — EXAM-PHYSICAL EXAM
Anoscopy: external tags, normal tone, grade II right posterior IH, grade I left lateral IH, grade I right anterior IH

## 2021-06-10 ENCOUNTER — TELEPHONE ENCOUNTER (OUTPATIENT)
Dept: URBAN - METROPOLITAN AREA CLINIC 113 | Facility: CLINIC | Age: 76
End: 2021-06-10

## 2021-06-10 RX ORDER — EZETIMIBE 10 MG/1
TAKE 1 TABLET DAILY TABLET ORAL
Refills: 0 | Status: ACTIVE | COMMUNITY
Start: 2020-03-04

## 2021-06-10 RX ORDER — FLUTICASONE FUROATE AND VILANTEROL TRIFENATATE 100; 25 UG/1; UG/1
1 PUFF POWDER RESPIRATORY (INHALATION) ONCE A DAY
Status: ACTIVE | COMMUNITY

## 2021-06-10 RX ORDER — ALUMINUM ZIRCONIUM OCTACHLOROHYDREX GLY 16 G/100G
AS DIRECTED GEL TOPICAL DAILY
Status: ON HOLD | COMMUNITY
Start: 2020-12-03

## 2021-06-10 RX ORDER — LIRAGLUTIDE 6 MG/ML
AS DIRECTED INJECTION SUBCUTANEOUS
Status: ACTIVE | COMMUNITY

## 2021-06-10 RX ORDER — PLECANATIDE 3 MG/1
1 TABLET TABLET ORAL ONCE A DAY
Qty: 90 | Refills: 1 | OUTPATIENT
Start: 2021-06-10 | End: 2021-12-06

## 2021-06-10 RX ORDER — PROPRANOLOL HYDROCHLORIDE 10 MG/1
1 TABLET TABLET ORAL ONCE A DAY
COMMUNITY

## 2021-06-10 RX ORDER — TEMAZEPAM 15 MG/1
1 CAPSULE AT BEDTIME AS NEEDED CAPSULE ORAL
Status: ACTIVE | COMMUNITY

## 2021-06-10 RX ORDER — PROPRANOLOL HYDROCHLORIDE 10 MG/1
1 TABLET IN AM TABLET ORAL
Refills: 0 | Status: ACTIVE | COMMUNITY
Start: 2019-05-02

## 2021-06-10 RX ORDER — GUAIFENESIN 600 MG/1
1 TABLET AS NEEDED TABLET, EXTENDED RELEASE ORAL
COMMUNITY

## 2021-06-10 RX ORDER — UBIDECARENONE 30 MG
1 TABLET WITH A MEAL CAPSULE ORAL ONCE A DAY
COMMUNITY

## 2021-06-10 RX ORDER — POLYETHYLENE GLYCOL 3350 17 G/17G
AS DIRECTED POWDER, FOR SOLUTION ORAL
Status: ACTIVE | COMMUNITY

## 2021-06-10 RX ORDER — TRAZODONE HYDROCHLORIDE 100 MG/1
TAKE 1/2 TO 1 TABLET AT BEDTIME TABLET, FILM COATED ORAL
Refills: 0 | Status: ACTIVE | COMMUNITY

## 2021-06-10 RX ORDER — IBANDRONATE SODIUM 150 MG/1
1 TABLET TABLET, FILM COATED ORAL
Status: ACTIVE | COMMUNITY

## 2021-06-10 RX ORDER — IPRATROPIUM BROMIDE 0.5 MG/2.5ML
AS DIRECTED SOLUTION RESPIRATORY (INHALATION)
Status: ACTIVE | COMMUNITY

## 2021-06-10 RX ORDER — LIOTHYRONINE SODIUM 25 UG/1
1 TABLET ON AN EMPTY STOMACH TABLET ORAL ONCE A DAY
Status: ACTIVE | COMMUNITY

## 2021-06-10 RX ORDER — IBUPROFEN SODIUM 256 MG/1
1 TABLET 220 MG WITH FOOD OR MILK AS NEEDED TABLET, COATED ORAL
COMMUNITY

## 2021-06-10 RX ORDER — OMEPRAZOLE 40 MG/1
TAKE 1 CAPSULE TWICE     DAILY CAPSULE, DELAYED RELEASE ORAL
COMMUNITY

## 2021-06-10 RX ORDER — QUETIAPINE FUMARATE 300 MG/1
1 TABLET AT BEDTIME TABLET ORAL ONCE A DAY
Status: ACTIVE | COMMUNITY

## 2021-06-10 RX ORDER — GABAPENTIN 100 MG/1
1 CAPSULE CAPSULE ORAL ONCE A DAY
COMMUNITY

## 2021-06-10 RX ORDER — CELECOXIB 200 MG/1
1 CAPSULE WITH FOOD CAPSULE ORAL ONCE A DAY
Status: ACTIVE | COMMUNITY

## 2021-06-10 RX ORDER — LUBIPROSTONE 24 UG/1
1 CAPSULE WITH FOOD AND WATER CAPSULE, GELATIN COATED ORAL TWICE A DAY
Status: ACTIVE | COMMUNITY

## 2021-06-10 RX ORDER — LEVOTHYROXINE SODIUM 100 UG/1
1 TABLET IN THE MORNING ON AN EMPTY STOMACH TABLET ORAL ONCE A DAY
Refills: 0 | Status: ACTIVE | COMMUNITY
Start: 2012-04-11

## 2021-06-10 RX ORDER — VENLAFAXINE HYDROCHLORIDE 100 MG/1
3  TABLET WITH FOOD TABLET ORAL ONCE A DAY
Status: ACTIVE | COMMUNITY

## 2021-06-10 RX ORDER — ROSUVASTATIN CALCIUM 40 MG/1
TAKE 1 TABLET DAILY TABLET, FILM COATED ORAL
Refills: 0 | Status: ACTIVE | COMMUNITY

## 2021-06-22 ENCOUNTER — TELEPHONE ENCOUNTER (OUTPATIENT)
Dept: URBAN - METROPOLITAN AREA CLINIC 113 | Facility: CLINIC | Age: 76
End: 2021-06-22

## 2021-06-23 PROBLEM — 721704005: Status: ACTIVE | Noted: 2021-06-23

## 2021-06-23 PROBLEM — 90446007: Status: ACTIVE | Noted: 2021-06-23

## 2021-07-09 ENCOUNTER — TELEPHONE ENCOUNTER (OUTPATIENT)
Dept: URBAN - METROPOLITAN AREA CLINIC 113 | Facility: CLINIC | Age: 76
End: 2021-07-09

## 2021-07-13 ENCOUNTER — LAB OUTSIDE AN ENCOUNTER (OUTPATIENT)
Dept: URBAN - METROPOLITAN AREA CLINIC 113 | Facility: CLINIC | Age: 76
End: 2021-07-13

## 2021-10-08 ENCOUNTER — OFFICE VISIT (OUTPATIENT)
Dept: URBAN - METROPOLITAN AREA CLINIC 113 | Facility: CLINIC | Age: 76
End: 2021-10-08

## 2021-11-12 ENCOUNTER — TELEPHONE ENCOUNTER (OUTPATIENT)
Dept: URBAN - METROPOLITAN AREA CLINIC 113 | Facility: CLINIC | Age: 76
End: 2021-11-12

## 2021-11-12 RX ORDER — LUBIPROSTONE 24 UG/1
1 CAPSULE WITH FOOD AND WATER CAPSULE, GELATIN COATED ORAL TWICE A DAY
Qty: 180 CAPSULE | Refills: 0

## 2021-11-12 RX ORDER — PLECANATIDE 3 MG/1
1 TABLET TABLET ORAL ONCE A DAY
OUTPATIENT
Start: 2021-06-10 | End: 2021-12-06

## 2021-12-20 ENCOUNTER — OFFICE VISIT (OUTPATIENT)
Dept: URBAN - METROPOLITAN AREA CLINIC 113 | Facility: CLINIC | Age: 76
End: 2021-12-20
Payer: MEDICARE

## 2021-12-20 ENCOUNTER — WEB ENCOUNTER (OUTPATIENT)
Dept: URBAN - METROPOLITAN AREA CLINIC 113 | Facility: CLINIC | Age: 76
End: 2021-12-20

## 2021-12-20 VITALS
DIASTOLIC BLOOD PRESSURE: 72 MMHG | BODY MASS INDEX: 30.36 KG/M2 | HEIGHT: 62 IN | TEMPERATURE: 97.3 F | RESPIRATION RATE: 18 BRPM | SYSTOLIC BLOOD PRESSURE: 110 MMHG | HEART RATE: 85 BPM | WEIGHT: 165 LBS

## 2021-12-20 DIAGNOSIS — K59.01 CONSTIPATION BY DELAYED COLONIC TRANSIT: ICD-10-CM

## 2021-12-20 DIAGNOSIS — K21.9 GASTROESOPHAGEAL REFLUX DISEASE WITHOUT ESOPHAGITIS: ICD-10-CM

## 2021-12-20 DIAGNOSIS — R14.3 EXCESSIVE GAS: ICD-10-CM

## 2021-12-20 DIAGNOSIS — Z12.11 COLON CANCER SCREENING: ICD-10-CM

## 2021-12-20 PROBLEM — 35298007: Status: ACTIVE | Noted: 2020-12-03

## 2021-12-20 PROBLEM — 266435005: Status: ACTIVE | Noted: 2020-09-13

## 2021-12-20 PROBLEM — 80301007: Status: ACTIVE | Noted: 2020-08-21

## 2021-12-20 PROBLEM — 305058001: Status: ACTIVE | Noted: 2021-12-20

## 2021-12-20 PROCEDURE — 99213 OFFICE O/P EST LOW 20 MIN: CPT | Performed by: INTERNAL MEDICINE

## 2021-12-20 RX ORDER — IPRATROPIUM BROMIDE 0.5 MG/2.5ML
AS DIRECTED SOLUTION RESPIRATORY (INHALATION)
Status: DISCONTINUED | COMMUNITY

## 2021-12-20 RX ORDER — LIRAGLUTIDE 6 MG/ML
AS DIRECTED INJECTION SUBCUTANEOUS
Status: ACTIVE | COMMUNITY

## 2021-12-20 RX ORDER — TEMAZEPAM 15 MG/1
1 CAPSULE AT BEDTIME AS NEEDED CAPSULE ORAL
Status: ACTIVE | COMMUNITY

## 2021-12-20 RX ORDER — GABAPENTIN 300 MG/1
1 CAPSULE CAPSULE ORAL ONCE A DAY
Status: ACTIVE | COMMUNITY

## 2021-12-20 RX ORDER — LUBIPROSTONE 24 UG/1
1 CAPSULE WITH FOOD AND WATER CAPSULE, GELATIN COATED ORAL TWICE A DAY
OUTPATIENT

## 2021-12-20 RX ORDER — VENLAFAXINE HYDROCHLORIDE 100 MG/1
3  TABLET WITH FOOD TABLET ORAL ONCE A DAY
Status: ACTIVE | COMMUNITY

## 2021-12-20 RX ORDER — QUETIAPINE FUMARATE 300 MG/1
1 TABLET AT BEDTIME TABLET ORAL ONCE A DAY
Status: ACTIVE | COMMUNITY

## 2021-12-20 RX ORDER — OMEPRAZOLE 40 MG/1
TAKE 1 CAPSULE TWICE     DAILY CAPSULE, DELAYED RELEASE ORAL
OUTPATIENT

## 2021-12-20 RX ORDER — POLYETHYLENE GLYCOL 3350 17 G/17G
AS DIRECTED POWDER, FOR SOLUTION ORAL
Status: ON HOLD | COMMUNITY

## 2021-12-20 RX ORDER — PROPRANOLOL HYDROCHLORIDE 10 MG/1
1 TABLET IN AM TABLET ORAL
Refills: 0 | Status: DISCONTINUED | COMMUNITY
Start: 2019-05-02

## 2021-12-20 RX ORDER — UBIDECARENONE 30 MG
1 TABLET WITH A MEAL CAPSULE ORAL ONCE A DAY
Status: DISCONTINUED | COMMUNITY

## 2021-12-20 RX ORDER — OMEPRAZOLE 40 MG/1
TAKE 1 CAPSULE TWICE     DAILY CAPSULE, DELAYED RELEASE ORAL
Status: ACTIVE | COMMUNITY

## 2021-12-20 RX ORDER — LUBIPROSTONE 24 UG/1
1 CAPSULE WITH FOOD AND WATER CAPSULE, GELATIN COATED ORAL TWICE A DAY
Qty: 180 CAPSULE | Refills: 0 | Status: ACTIVE | COMMUNITY

## 2021-12-20 RX ORDER — CELECOXIB 200 MG/1
1 CAPSULE WITH FOOD CAPSULE ORAL ONCE A DAY
Status: ACTIVE | COMMUNITY

## 2021-12-20 RX ORDER — ALUMINUM ZIRCONIUM OCTACHLOROHYDREX GLY 16 G/100G
AS DIRECTED GEL TOPICAL DAILY
Status: DISCONTINUED | COMMUNITY
Start: 2020-12-03

## 2021-12-20 RX ORDER — ROSUVASTATIN CALCIUM 40 MG/1
TAKE 1 TABLET DAILY TABLET, FILM COATED ORAL
Refills: 0 | Status: ACTIVE | COMMUNITY

## 2021-12-20 RX ORDER — TRAZODONE HYDROCHLORIDE 50 MG/1
1 TABLET AT BEDTIME AS NEEDED TABLET, FILM COATED ORAL ONCE A DAY
Refills: 0 | Status: ACTIVE | COMMUNITY

## 2021-12-20 RX ORDER — IBUPROFEN SODIUM 256 MG/1
1 TABLET 220 MG WITH FOOD OR MILK AS NEEDED TABLET, COATED ORAL
Status: DISCONTINUED | COMMUNITY

## 2021-12-20 RX ORDER — IBANDRONATE SODIUM 150 MG/1
1 TABLET TABLET, FILM COATED ORAL
Status: ACTIVE | COMMUNITY

## 2021-12-20 RX ORDER — MODAFINIL 200 MG/1
1 TABLET IN THE MORNING TABLET ORAL ONCE A DAY
Status: ACTIVE | COMMUNITY

## 2021-12-20 RX ORDER — CYANOCOBALAMIN (VITAMIN B-12) 1000 MCG
1 TABLET TABLET, EXTENDED RELEASE ORAL ONCE A DAY
Status: ACTIVE | COMMUNITY

## 2021-12-20 RX ORDER — PROPRANOLOL HYDROCHLORIDE 10 MG/1
1 TABLET TABLET ORAL ONCE A DAY
Status: DISCONTINUED | COMMUNITY

## 2021-12-20 RX ORDER — LIOTHYRONINE SODIUM 25 UG/1
1 TABLET ON AN EMPTY STOMACH TABLET ORAL ONCE A DAY
Status: ACTIVE | COMMUNITY

## 2021-12-20 RX ORDER — NEBIVOLOL HYDROCHLORIDE 5 MG/1
1 TABLET TABLET ORAL ONCE A DAY
Status: ACTIVE | COMMUNITY

## 2021-12-20 RX ORDER — GUAIFENESIN 600 MG/1
1 TABLET AS NEEDED TABLET, EXTENDED RELEASE ORAL
Status: DISCONTINUED | COMMUNITY

## 2021-12-20 RX ORDER — FLUTICASONE FUROATE AND VILANTEROL TRIFENATATE 100; 25 UG/1; UG/1
1 PUFF POWDER RESPIRATORY (INHALATION) ONCE A DAY
Status: ACTIVE | COMMUNITY

## 2021-12-20 RX ORDER — LEVOTHYROXINE SODIUM 112 UG/1
1 TABLET IN THE MORNING ON AN EMPTY STOMACH TABLET ORAL ONCE A DAY
Refills: 0 | Status: ACTIVE | COMMUNITY
Start: 2012-04-11

## 2021-12-20 RX ORDER — EZETIMIBE 10 MG/1
TAKE 1 TABLET DAILY TABLET ORAL
Refills: 0 | Status: ACTIVE | COMMUNITY
Start: 2020-03-04

## 2021-12-20 NOTE — HPI-TODAY'S VISIT:
She was last seen on 6/4/2021 for follow-up regarding slow transit constipation, excessive gas and GERD.  She was recommended to begin Trulance 3 mg daily for constipation and use omeprazole twice daily for relief of GERD symptoms. She reports that the Trulance was helpful, but it is too expensive.  She is currently taking lubiprostone 24 mcg twice daily with meals.  She is currently having a bowel movement every 2 to 3 days.  She continues to experience excessive gas.  She denies any obvious food trigger.  She has not tried a probiotic.  She reports that her heartburn symptoms are well controlled on omeprazole 40 mg twice daily before meals.  She reports that she experiences frequent breakthrough symptoms when trying to decrease the frequency of omeprazole to once daily.  She denies any dysphagia. Most recent labs on 9/20/2021 showed WBC 6.3, hemoglobin 12.7, MCV 96, platelets 234, unremarkable urinalysis, T4 3.9 (low) and free T3 3.6. Her last colonoscopy on 12/18/2012 was a difficult procedure due to excessive looping with findings notable for descending colon lipoma, sigmoid colon diverticulosis and removal of a 4 mm cecal hyperplastic polyp.

## 2021-12-20 NOTE — HPI-OTHER HISTORIES
CT abdomen and pelvis with contrast (5/8/17): Constipation, atherosclerotic vascular disease Colonoscopy (12/18/12): difficult procedure secondary to looping, removal of a 4 mm cecal hyperplastic polyp, there was a descending colon lipoma, sigmoid colon diverticulosis. CT abdomen and pelvis with contrast (5/8/17): Constipation, atherosclerotic vascular disease Colonoscopy (12/18/12): difficult procedure secondary to looping, removal of a 4 mm cecal hyperplastic polyp, there was a descending colon lipoma, sigmoid colon diverticulosis.

## 2021-12-20 NOTE — HPI-TODAY'S VISIT:
Ms. Reynolds is a 75 yo woman with a history of T2DM, CAD, PVD, presenting for follow up regarding GERD, constipation predominant IBS and excess gas.

## 2022-01-21 ENCOUNTER — OFFICE VISIT (OUTPATIENT)
Dept: URBAN - METROPOLITAN AREA CLINIC 113 | Facility: CLINIC | Age: 77
End: 2022-01-21

## 2022-02-21 ENCOUNTER — ERX REFILL RESPONSE (OUTPATIENT)
Dept: URBAN - METROPOLITAN AREA CLINIC 113 | Facility: CLINIC | Age: 77
End: 2022-02-21

## 2022-02-21 RX ORDER — LUBIPROSTONE 24 UG/1
TAKE ONE CAPSULE BY MOUTH TWICE A DAY WITH FOOD AND WATER CAPSULE, GELATIN COATED ORAL
Qty: 180 CAPSULE | Refills: 1 | OUTPATIENT

## 2022-03-01 ENCOUNTER — LAB OUTSIDE AN ENCOUNTER (OUTPATIENT)
Dept: URBAN - METROPOLITAN AREA CLINIC 113 | Facility: CLINIC | Age: 77
End: 2022-03-01

## 2022-03-14 ENCOUNTER — TELEPHONE ENCOUNTER (OUTPATIENT)
Dept: URBAN - METROPOLITAN AREA CLINIC 113 | Facility: CLINIC | Age: 77
End: 2022-03-14

## 2022-03-14 RX ORDER — PANTOPRAZOLE SODIUM 40 MG/1
1 TABLET TABLET, DELAYED RELEASE ORAL ONCE A DAY
Qty: 30 | Refills: 5 | OUTPATIENT
Start: 2022-03-14

## 2022-03-14 RX ORDER — OMEPRAZOLE 40 MG/1
TAKE 1 CAPSULE TWICE     DAILY CAPSULE, DELAYED RELEASE ORAL
OUTPATIENT

## 2022-04-21 ENCOUNTER — OFFICE VISIT (OUTPATIENT)
Dept: URBAN - METROPOLITAN AREA MEDICAL CENTER 2 | Facility: MEDICAL CENTER | Age: 77
End: 2022-04-21

## 2023-03-14 ENCOUNTER — OFFICE VISIT (OUTPATIENT)
Dept: URBAN - METROPOLITAN AREA CLINIC 113 | Facility: CLINIC | Age: 78
End: 2023-03-14

## 2023-06-20 ENCOUNTER — DASHBOARD ENCOUNTERS (OUTPATIENT)
Age: 78
End: 2023-06-20

## 2023-06-20 ENCOUNTER — OFFICE VISIT (OUTPATIENT)
Dept: URBAN - METROPOLITAN AREA CLINIC 113 | Facility: CLINIC | Age: 78
End: 2023-06-20
Payer: MEDICARE

## 2023-06-20 VITALS
WEIGHT: 152 LBS | SYSTOLIC BLOOD PRESSURE: 116 MMHG | RESPIRATION RATE: 20 BRPM | DIASTOLIC BLOOD PRESSURE: 75 MMHG | TEMPERATURE: 97.2 F | HEART RATE: 115 BPM | BODY MASS INDEX: 27.97 KG/M2 | HEIGHT: 62 IN

## 2023-06-20 DIAGNOSIS — K21.9 GASTROESOPHAGEAL REFLUX DISEASE WITHOUT ESOPHAGITIS: ICD-10-CM

## 2023-06-20 DIAGNOSIS — R14.3 EXCESSIVE GAS: ICD-10-CM

## 2023-06-20 DIAGNOSIS — K59.01 CONSTIPATION BY DELAYED COLONIC TRANSIT: ICD-10-CM

## 2023-06-20 PROCEDURE — 99213 OFFICE O/P EST LOW 20 MIN: CPT | Performed by: INTERNAL MEDICINE

## 2023-06-20 RX ORDER — FLUDROCORTISONE ACETATE 0.1 MG/1
1 TABLET TABLET ORAL ONCE A DAY
Status: ACTIVE | COMMUNITY

## 2023-06-20 RX ORDER — POLYETHYLENE GLYCOL 3350 17 G/17G
AS DIRECTED POWDER, FOR SOLUTION ORAL
Status: ON HOLD | COMMUNITY

## 2023-06-20 RX ORDER — TRAZODONE HYDROCHLORIDE 150 MG/1
1 TABLET AT BEDTIME AS NEEDED TABLET ORAL ONCE A DAY
Refills: 0 | Status: ACTIVE | COMMUNITY

## 2023-06-20 RX ORDER — LUBIPROSTONE 24 UG/1
1 CAPSULE WITH FOOD AND WATER CAPSULE, GELATIN COATED ORAL TWICE A DAY
Status: ACTIVE | COMMUNITY

## 2023-06-20 RX ORDER — MONTELUKAST SODIUM 10 MG/1
1 TABLET TABLET, FILM COATED ORAL ONCE A DAY
Status: ACTIVE | COMMUNITY

## 2023-06-20 RX ORDER — PANTOPRAZOLE SODIUM 40 MG/1
1 TABLET TABLET, DELAYED RELEASE ORAL ONCE A DAY
Qty: 30 | Refills: 5 | Status: ACTIVE | COMMUNITY
Start: 2022-03-14

## 2023-06-20 RX ORDER — GABAPENTIN 300 MG/1
1 CAPSULE CAPSULE ORAL ONCE A DAY
Status: ACTIVE | COMMUNITY

## 2023-06-20 RX ORDER — ROSUVASTATIN CALCIUM 40 MG/1
TAKE 1 TABLET DAILY TABLET, FILM COATED ORAL
Refills: 0 | Status: ACTIVE | COMMUNITY

## 2023-06-20 RX ORDER — QUETIAPINE FUMARATE 300 MG/1
1 TABLET AT BEDTIME TABLET ORAL ONCE A DAY
Status: ACTIVE | COMMUNITY

## 2023-06-20 RX ORDER — MODAFINIL 200 MG/1
1 TABLET IN THE MORNING TABLET ORAL ONCE A DAY
Status: ACTIVE | COMMUNITY

## 2023-06-20 RX ORDER — LEVOTHYROXINE SODIUM 125 UG/1
1 TABLET IN THE MORNING ON AN EMPTY STOMACH TABLET ORAL ONCE A DAY
Refills: 0 | Status: ACTIVE | COMMUNITY
Start: 2012-04-11

## 2023-06-20 RX ORDER — QUETIAPINE 50 MG/1
1 TABLET AT BEDTIME TABLET, FILM COATED ORAL ONCE A DAY
Status: ACTIVE | COMMUNITY

## 2023-06-20 RX ORDER — VENLAFAXINE HYDROCHLORIDE 100 MG/1
3  TABLET WITH FOOD TABLET ORAL ONCE A DAY
Status: ACTIVE | COMMUNITY

## 2023-06-20 RX ORDER — LUBIPROSTONE 24 UG/1
TAKE ONE CAPSULE BY MOUTH TWICE A DAY WITH FOOD AND WATER CAPSULE, GELATIN COATED ORAL
Qty: 180 CAPSULE | Refills: 1 | Status: ON HOLD | COMMUNITY

## 2023-06-20 RX ORDER — EZETIMIBE 10 MG/1
TAKE 1 TABLET DAILY TABLET ORAL
Refills: 0 | Status: ON HOLD | COMMUNITY
Start: 2020-03-04

## 2023-06-20 RX ORDER — FLUTICASONE FUROATE AND VILANTEROL TRIFENATATE 100; 25 UG/1; UG/1
1 PUFF POWDER RESPIRATORY (INHALATION) ONCE A DAY
Status: ON HOLD | COMMUNITY

## 2023-06-20 RX ORDER — PROPRANOLOL HYDROCHLORIDE 40 MG/1
1 TABLET TABLET ORAL ONCE A DAY
Status: ACTIVE | COMMUNITY

## 2023-06-20 RX ORDER — LEVOTHYROXINE SODIUM 137 UG/1
1 TABLET IN THE MORNING ON AN EMPTY STOMACH TABLET ORAL ONCE A DAY
Status: ACTIVE | COMMUNITY

## 2023-06-20 RX ORDER — PANTOPRAZOLE SODIUM 40 MG/1
1 TABLET TABLET, DELAYED RELEASE ORAL ONCE A DAY
OUTPATIENT
Start: 2022-03-14

## 2023-06-20 RX ORDER — LIOTHYRONINE SODIUM 25 UG/1
1 TABLET ON AN EMPTY STOMACH TABLET ORAL ONCE A DAY
Status: ACTIVE | COMMUNITY

## 2023-06-20 RX ORDER — VITAMIN B COMPLEX
AS DIRECTED CAPSULE ORAL
Status: ACTIVE | COMMUNITY

## 2023-06-20 RX ORDER — IPRATROPIUM BROMIDE 42 UG/1
2 SPRAYS IN EACH NOSTRIL SPRAY NASAL THREE TIMES A DAY
Status: ACTIVE | COMMUNITY

## 2023-06-20 RX ORDER — CYANOCOBALAMIN (VITAMIN B-12) 1000 MCG
1 TABLET TABLET, EXTENDED RELEASE ORAL ONCE A DAY
Status: ACTIVE | COMMUNITY

## 2023-06-20 RX ORDER — LIRAGLUTIDE 6 MG/ML
AS DIRECTED INJECTION SUBCUTANEOUS
Status: ACTIVE | COMMUNITY

## 2023-06-20 RX ORDER — TEMAZEPAM 15 MG/1
1 CAPSULE AT BEDTIME AS NEEDED CAPSULE ORAL
Status: ACTIVE | COMMUNITY

## 2023-06-20 RX ORDER — IBANDRONATE SODIUM 150 MG/1
1 TABLET TABLET, FILM COATED ORAL
Status: ACTIVE | COMMUNITY

## 2023-06-20 RX ORDER — NEBIVOLOL HYDROCHLORIDE 5 MG/1
1 TABLET TABLET ORAL ONCE A DAY
Status: ACTIVE | COMMUNITY

## 2023-06-20 RX ORDER — FLUTICASONE FUROATE AND VILANTEROL TRIFENATATE 100; 25 UG/1; UG/1
1 PUFF POWDER RESPIRATORY (INHALATION) ONCE A DAY
Status: ACTIVE | COMMUNITY

## 2023-06-20 RX ORDER — CELECOXIB 200 MG/1
1 CAPSULE WITH FOOD CAPSULE ORAL ONCE A DAY
Status: ACTIVE | COMMUNITY

## 2023-06-20 NOTE — HPI-TODAY'S VISIT:
Ms. Reynolds is a 79 yo woman with a history of T2DM, CAD, PVD, presenting for follow up regarding GERD, constipation predominant IBS and excess gas.  She was last seen on 12/20/2021 for follow-up regarding constipation, excessive gas/bloating and GERD.  She is recommended to continue Amitiza 24 mcg twice daily for constipation predominant irritable bowel syndrome and use omeprazole 40 mg twice daily for GERD.  A colonoscopy was ordered for colorectal cancer screening.  She returns for follow-up reporting that she decided to cancel the colonoscopy and lieu of a negative Cologuard test (6/22/2022).  She is doing fairly well but has ongoing issue of increased flatulence.  She has a bowel movement every 2 to 3 days taking Amitiza 24 mcg twice daily.  She requires straining to have a bowel movement.  Denies any red blood per rectum.  Her GERD symptoms are controlled on pantoprazole 40 mg daily.  She denies any dysphagia.  Labs on 4/20/2023 showed WBC 5.8, hemoglobin 12.1, platelets 308. Pt with c/o PMB  Also intermittent for  Years- see HPI  Reviewed w/u for same includes endometrial sampling, typically via EMB to r/u Cancer/precancerous changed  Offered to do EMB today vs check US then consider EMB  If US shows ET <4mm there is a 99% negative predictive value for EM cancer    Given prior hx of diffucult EMB and Scant sample- will check US first and f/u with Physician for possible D&C hysteroscopy

## 2023-06-28 ENCOUNTER — TELEPHONE ENCOUNTER (OUTPATIENT)
Dept: URBAN - METROPOLITAN AREA CLINIC 113 | Facility: CLINIC | Age: 78
End: 2023-06-28

## 2024-06-27 ENCOUNTER — OFFICE VISIT (OUTPATIENT)
Dept: URBAN - METROPOLITAN AREA CLINIC 113 | Facility: CLINIC | Age: 79
End: 2024-06-27

## 2024-08-20 ENCOUNTER — OFFICE VISIT (OUTPATIENT)
Dept: URBAN - METROPOLITAN AREA CLINIC 113 | Facility: CLINIC | Age: 79
End: 2024-08-20

## 2024-08-20 VITALS
SYSTOLIC BLOOD PRESSURE: 120 MMHG | HEART RATE: 71 BPM | WEIGHT: 145 LBS | HEIGHT: 62 IN | RESPIRATION RATE: 20 BRPM | TEMPERATURE: 98 F | DIASTOLIC BLOOD PRESSURE: 74 MMHG | BODY MASS INDEX: 26.68 KG/M2

## 2024-08-20 RX ORDER — NEBIVOLOL HYDROCHLORIDE 5 MG/1
1 TABLET TABLET ORAL ONCE A DAY
Status: ACTIVE | COMMUNITY

## 2024-08-20 RX ORDER — POLYETHYLENE GLYCOL 3350 17 G/17G
AS DIRECTED POWDER, FOR SOLUTION ORAL
Status: ON HOLD | COMMUNITY

## 2024-08-20 RX ORDER — LUBIPROSTONE 24 UG/1
1 CAPSULE WITH FOOD AND WATER CAPSULE, GELATIN COATED ORAL TWICE A DAY
OUTPATIENT

## 2024-08-20 RX ORDER — QUETIAPINE FUMARATE 300 MG/1
1 TABLET AT BEDTIME TABLET ORAL ONCE A DAY
Status: ACTIVE | COMMUNITY

## 2024-08-20 RX ORDER — FLUTICASONE FUROATE AND VILANTEROL TRIFENATATE 100; 25 UG/1; UG/1
1 PUFF POWDER RESPIRATORY (INHALATION) ONCE A DAY
Status: ACTIVE | COMMUNITY

## 2024-08-20 RX ORDER — LIOTHYRONINE SODIUM 25 UG/1
1 TABLET ON AN EMPTY STOMACH TABLET ORAL ONCE A DAY
Status: ACTIVE | COMMUNITY

## 2024-08-20 RX ORDER — IBANDRONATE SODIUM 150 MG/1
1 TABLET TABLET, FILM COATED ORAL
Status: ON HOLD | COMMUNITY

## 2024-08-20 RX ORDER — CELECOXIB 200 MG/1
1 CAPSULE WITH FOOD CAPSULE ORAL ONCE A DAY
Status: ACTIVE | COMMUNITY

## 2024-08-20 RX ORDER — VITAMIN B COMPLEX
AS DIRECTED CAPSULE ORAL
Status: ACTIVE | COMMUNITY

## 2024-08-20 RX ORDER — QUETIAPINE 50 MG/1
1 TABLET AT BEDTIME TABLET, FILM COATED ORAL ONCE A DAY
Status: ACTIVE | COMMUNITY

## 2024-08-20 RX ORDER — VENLAFAXINE HYDROCHLORIDE 100 MG/1
3  TABLET WITH FOOD TABLET ORAL ONCE A DAY
Status: ACTIVE | COMMUNITY

## 2024-08-20 RX ORDER — FLUTICASONE FUROATE AND VILANTEROL TRIFENATATE 100; 25 UG/1; UG/1
1 PUFF POWDER RESPIRATORY (INHALATION) ONCE A DAY
Status: ON HOLD | COMMUNITY

## 2024-08-20 RX ORDER — ROSUVASTATIN CALCIUM 40 MG/1
TAKE 1 TABLET DAILY TABLET, FILM COATED ORAL
Refills: 0 | Status: ACTIVE | COMMUNITY

## 2024-08-20 RX ORDER — TRAZODONE HYDROCHLORIDE 150 MG/1
1 TABLET AT BEDTIME AS NEEDED TABLET ORAL ONCE A DAY
Refills: 0 | Status: ACTIVE | COMMUNITY

## 2024-08-20 RX ORDER — PROPRANOLOL HYDROCHLORIDE 40 MG/1
1 TABLET TABLET ORAL ONCE A DAY
Status: ACTIVE | COMMUNITY

## 2024-08-20 RX ORDER — LUBIPROSTONE 24 UG/1
1 CAPSULE WITH FOOD AND WATER CAPSULE, GELATIN COATED ORAL TWICE A DAY
Status: ACTIVE | COMMUNITY

## 2024-08-20 RX ORDER — PANTOPRAZOLE SODIUM 40 MG/1
1 TABLET TABLET, DELAYED RELEASE ORAL ONCE A DAY
Status: ACTIVE | COMMUNITY
Start: 2022-03-14

## 2024-08-20 RX ORDER — LIRAGLUTIDE 6 MG/ML
AS DIRECTED INJECTION SUBCUTANEOUS
Status: ACTIVE | COMMUNITY

## 2024-08-20 RX ORDER — GABAPENTIN 300 MG/1
1 CAPSULE CAPSULE ORAL ONCE A DAY
Status: ACTIVE | COMMUNITY

## 2024-08-20 RX ORDER — LEVOTHYROXINE SODIUM 125 UG/1
1 TABLET IN THE MORNING ON AN EMPTY STOMACH TABLET ORAL ONCE A DAY
Refills: 0 | Status: ACTIVE | COMMUNITY
Start: 2012-04-11

## 2024-08-20 RX ORDER — TEMAZEPAM 15 MG/1
1 CAPSULE AT BEDTIME AS NEEDED CAPSULE ORAL
Status: ACTIVE | COMMUNITY

## 2024-08-20 RX ORDER — LUBIPROSTONE 24 UG/1
TAKE ONE CAPSULE BY MOUTH TWICE A DAY WITH FOOD AND WATER CAPSULE, GELATIN COATED ORAL
Qty: 180 CAPSULE | Refills: 1 | Status: ON HOLD | COMMUNITY

## 2024-08-20 RX ORDER — CYANOCOBALAMIN (VITAMIN B-12) 1000 MCG
1 TABLET TABLET, EXTENDED RELEASE ORAL ONCE A DAY
Status: ACTIVE | COMMUNITY

## 2024-08-20 RX ORDER — MONTELUKAST SODIUM 10 MG/1
1 TABLET TABLET, FILM COATED ORAL ONCE A DAY
Status: ACTIVE | COMMUNITY

## 2024-08-20 RX ORDER — MODAFINIL 200 MG/1
1 TABLET IN THE MORNING TABLET ORAL ONCE A DAY
Status: ACTIVE | COMMUNITY

## 2024-08-20 RX ORDER — FLUDROCORTISONE ACETATE 0.1 MG/1
1 TABLET TABLET ORAL ONCE A DAY
Status: ACTIVE | COMMUNITY

## 2024-08-20 RX ORDER — LEVOTHYROXINE SODIUM 137 UG/1
1 TABLET IN THE MORNING ON AN EMPTY STOMACH TABLET ORAL ONCE A DAY
Status: ACTIVE | COMMUNITY

## 2024-08-20 RX ORDER — IPRATROPIUM BROMIDE 42 UG/1
2 SPRAYS IN EACH NOSTRIL SPRAY NASAL THREE TIMES A DAY
Status: ACTIVE | COMMUNITY

## 2024-08-20 RX ORDER — EZETIMIBE 10 MG/1
TAKE 1 TABLET DAILY TABLET ORAL
Refills: 0 | Status: ON HOLD | COMMUNITY
Start: 2020-03-04

## 2024-08-20 NOTE — HPI-TODAY'S VISIT:
Medication:  Black Cohosh 540 MG Cap    PCP: Romeo Peña M.D.  Preferred Contact Number: [phone] (mobile)      Pharmacy:  [facility]    Patient instructed to call pharmacy directly for future refills.      Advised patient that the nurse will call if there are questions or concerns, otherwise refill processing may take 48-72 hours.        Ms. Reynolds is a 79 yo woman with a history of T2DM, CAD, PVD, presenting for follow up regarding GERD, constipation predominant IBS and excess gas.

## 2024-08-29 ENCOUNTER — TELEPHONE ENCOUNTER (OUTPATIENT)
Dept: URBAN - METROPOLITAN AREA CLINIC 113 | Facility: CLINIC | Age: 79
End: 2024-08-29